# Patient Record
Sex: MALE | Race: WHITE | URBAN - METROPOLITAN AREA
[De-identification: names, ages, dates, MRNs, and addresses within clinical notes are randomized per-mention and may not be internally consistent; named-entity substitution may affect disease eponyms.]

---

## 2017-01-02 DIAGNOSIS — F41.9 ANXIETY: Primary | ICD-10-CM

## 2017-01-03 RX ORDER — CITALOPRAM HYDROBROMIDE 40 MG/1
40 TABLET ORAL DAILY
Qty: 30 TABLET | Refills: 0 | Status: SHIPPED | OUTPATIENT
Start: 2017-01-03 | End: 2017-02-16

## 2017-01-03 NOTE — TELEPHONE ENCOUNTER
Needs to be seen. He has not been seen in a year. Needs to be seen at least once per year.    Kim Hui

## 2017-01-03 NOTE — TELEPHONE ENCOUNTER
Please see 2/24/16 refill encounter.  PHQ-9 SCORE    1/23/2014    1/6/2016    3/7/2016      Total Score    6    -    -      Total Score MyChart    -    -    0      Total Score    -    18    -          ELPIDIO-7 SCORE    1/27/2014    1/6/2016    3/7/2016      Total Score    7    -    -      Total Score    -    -    0 (minimal anxiety)      Total Score    -    16    -        The only reason, RN cannot refill is because per last ov patient was to follow up in one month, however patient stated no, he was going to check in via mychart (versus ov). To provider to advise.    Faye Lloyd, RN

## 2017-01-04 ENCOUNTER — MYC MEDICAL ADVICE (OUTPATIENT)
Dept: FAMILY MEDICINE | Facility: CLINIC | Age: 32
End: 2017-01-04

## 2017-02-08 DIAGNOSIS — F41.9 ANXIETY: Primary | ICD-10-CM

## 2017-02-08 RX ORDER — CITALOPRAM HYDROBROMIDE 40 MG/1
40 TABLET ORAL DAILY
Qty: 30 TABLET | Refills: 0 | Status: CANCELLED | OUTPATIENT
Start: 2017-02-08

## 2017-02-09 ENCOUNTER — MYC MEDICAL ADVICE (OUTPATIENT)
Dept: FAMILY MEDICINE | Facility: CLINIC | Age: 32
End: 2017-02-09

## 2017-02-09 NOTE — TELEPHONE ENCOUNTER
Please call patient and have him schedule appt. Once he has, refill med for 30 days or to get him to his appt.    Kim Hui

## 2017-02-13 NOTE — TELEPHONE ENCOUNTER
Patient has not read his [employer] message. Called and left message for patient to return my cart.  Needs an appointment scheduled before refills can be given.  Please fill out the following questionnaire(s) prior to your upcoming appointment.  Griselda Wright cma

## 2017-02-14 NOTE — TELEPHONE ENCOUNTER
Lm for patient to return my call. This is the last attempted call will be closed.  Griselda Wright, cma

## 2017-02-16 DIAGNOSIS — F41.9 ANXIETY: ICD-10-CM

## 2017-02-17 RX ORDER — CITALOPRAM HYDROBROMIDE 40 MG/1
40 TABLET ORAL DAILY
Qty: 30 TABLET | Refills: 0 | Status: SHIPPED | OUTPATIENT
Start: 2017-02-17 | End: 2017-03-05

## 2017-02-17 NOTE — TELEPHONE ENCOUNTER
Duplicate refill request. See other request.  is trying to help patient schedule appt. Radha Nieves RN

## 2017-03-07 ENCOUNTER — OFFICE VISIT (OUTPATIENT)
Dept: FAMILY MEDICINE | Facility: CLINIC | Age: 32
End: 2017-03-07
Payer: COMMERCIAL

## 2017-03-07 VITALS
BODY MASS INDEX: 33.18 KG/M2 | DIASTOLIC BLOOD PRESSURE: 74 MMHG | SYSTOLIC BLOOD PRESSURE: 119 MMHG | WEIGHT: 224 LBS | TEMPERATURE: 98.4 F | HEART RATE: 97 BPM | HEIGHT: 69 IN

## 2017-03-07 DIAGNOSIS — F41.9 ANXIETY: ICD-10-CM

## 2017-03-07 PROCEDURE — 99213 OFFICE O/P EST LOW 20 MIN: CPT | Performed by: FAMILY MEDICINE

## 2017-03-07 RX ORDER — CITALOPRAM HYDROBROMIDE 40 MG/1
40 TABLET ORAL DAILY
Qty: 90 TABLET | Refills: 3 | Status: SHIPPED | OUTPATIENT
Start: 2017-03-07 | End: 2018-07-22

## 2017-03-07 RX ORDER — BUPROPION HYDROCHLORIDE 150 MG/1
150 TABLET ORAL EVERY MORNING
Qty: 90 TABLET | Refills: 3 | Status: SHIPPED | OUTPATIENT
Start: 2017-03-07 | End: 2018-07-22

## 2017-03-07 RX ORDER — ALPRAZOLAM 0.25 MG
0.25 TABLET ORAL 3 TIMES DAILY PRN
Qty: 20 TABLET | Refills: 0 | Status: SHIPPED | OUTPATIENT
Start: 2017-03-07

## 2017-03-07 ASSESSMENT — ANXIETY QUESTIONNAIRES
IF YOU CHECKED OFF ANY PROBLEMS ON THIS QUESTIONNAIRE, HOW DIFFICULT HAVE THESE PROBLEMS MADE IT FOR YOU TO DO YOUR WORK, TAKE CARE OF THINGS AT HOME, OR GET ALONG WITH OTHER PEOPLE: SOMEWHAT DIFFICULT
5. BEING SO RESTLESS THAT IT IS HARD TO SIT STILL: NOT AT ALL
3. WORRYING TOO MUCH ABOUT DIFFERENT THINGS: NOT AT ALL
2. NOT BEING ABLE TO STOP OR CONTROL WORRYING: NOT AT ALL
1. FEELING NERVOUS, ANXIOUS, OR ON EDGE: SEVERAL DAYS
7. FEELING AFRAID AS IF SOMETHING AWFUL MIGHT HAPPEN: SEVERAL DAYS
GAD7 TOTAL SCORE: 4
6. BECOMING EASILY ANNOYED OR IRRITABLE: MORE THAN HALF THE DAYS

## 2017-03-07 ASSESSMENT — PATIENT HEALTH QUESTIONNAIRE - PHQ9: 5. POOR APPETITE OR OVEREATING: NOT AT ALL

## 2017-03-07 NOTE — NURSING NOTE
"Chief Complaint   Patient presents with     Depression       Initial /74  Pulse 97  Temp 98.4  F (36.9  C) (Oral)  Ht 5' 9\" (1.753 m)  Wt 224 lb (101.6 kg)  BMI 33.08 kg/m2 Estimated body mass index is 33.08 kg/(m^2) as calculated from the following:    Height as of this encounter: 5' 9\" (1.753 m).    Weight as of this encounter: 224 lb (101.6 kg).  Medication Reconciliation: complete     Kinza Cruz MA      "

## 2017-03-07 NOTE — MR AVS SNAPSHOT
"              After Visit Summary   3/7/2017    David Rodriguez    MRN: [id number]           Patient Information     Date Of Birth          1985        Visit Information        Provider Department      3/7/2017 11:20 AM Kim Benedict MD [facility]        Today's Diagnoses     Anxiety           Follow-ups after your visit        Who to contact     If you have questions or need follow up information about today's clinic visit or your schedule please contact [facility] directly at [phone].  Normal or non-critical lab and imaging results will be communicated to you by MyChart, letter or phone within 4 business days after the clinic has received the results. If you do not hear from us within 7 days, please contact the clinic through eelusionhart or phone. If you have a critical or abnormal lab result, we will notify you by phone as soon as possible.  Submit refill requests through [employer] or call your pharmacy and they will forward the refill request to us. Please allow 3 business days for your refill to be completed.          Additional Information About Your Visit        MyChart Information     [employer] gives you secure access to your electronic health record. If you see a primary care provider, you can also send messages to your care team and make appointments. If you have questions, please call your primary care clinic.  If you do not have a primary care provider, please call [phone] and they will assist you.        Care EveryWhere ID     This is your Care EveryWhere ID. This could be used by other organizations to access your [facility] medical records  [id number]        Your Vitals Were     Pulse Temperature Height BMI (Body Mass Index)          97 98.4  F (36.9  C) (Oral) 5' 9\" (1.753 m) 33.08 kg/m2         Blood Pressure from Last 3 Encounters:   03/07/17 119/74   01/06/16 143/81   11/05/12 122/72    Weight from Last 3 Encounters:   03/07/17 224 lb (101.6 kg)   01/06/16 194 lb " (88 kg)   11/05/12 185 lb (83.9 kg)              Today, you had the following     No orders found for display         Today's Medication Changes          These changes are accurate as of: 3/7/17 11:59 PM.  If you have any questions, ask your nurse or doctor.               These medicines have changed or have updated prescriptions.        Dose/Directions    citalopram 40 MG tablet   Commonly known as:  celeXA   This may have changed:  additional instructions   Used for:  Anxiety   Changed by:  Kim Benedict MD        Dose:  40 mg   Take 1 tablet (40 mg) by mouth daily   Quantity:  90 tablet   Refills:  3            Where to get your medicines      These medications were sent to [facility] [address] [place], [place] [address] [facility] [place] [address] [employer] [facility] [address]     Phone:  [phone]     buPROPion 150 MG 24 hr tablet    citalopram 40 MG tablet         Some of these will need a paper prescription and others can be bought over the counter.  Ask your nurse if you have questions.     Bring a paper prescription for each of these medications     ALPRAZolam 0.25 MG tablet                Primary Care Provider Office Phone # Fax #    Kim Benedict [phone]       [facility] [id number] [place] [address]        Thank you!     Thank you for choosing [facility]  for your care. Our goal is always to provide you with excellent care. Hearing back from our patients is one way we can continue to improve our services. Please take a few minutes to complete the written survey that you may receive in the mail after your visit with us. Thank you!             Your Updated Medication List - Protect others around you: Learn how to safely use, store and throw away your medicines at www.disposemymeds.org.          This list is accurate as of: 3/7/17 11:59 PM.  Always use your most recent med list.                   Brand Name  Dispense Instructions for use    ALPRAZolam 0.25 MG tablet    XANAX    20 tablet    Take 1 tablet (0.25 mg) by mouth 3 times daily as needed for anxiety       buPROPion 150 MG 24 hr tablet    WELLBUTRIN XL    90 tablet    Take 1 tablet (150 mg) by mouth every morning       citalopram 40 MG tablet    celeXA    90 tablet    Take 1 tablet (40 mg) by mouth daily

## 2017-03-07 NOTE — PROGRESS NOTES
"  SUBJECTIVE:                                                    David Rodriguez is a 32 year old male who presents to clinic today for the following health issues:        Depression and Anxiety Follow-Up    Status since last visit: No change    Other associated symptoms:None    Complicating factors:     Significant life event: No     Current substance abuse: None    PHQ-9 SCORE 1/23/2014 1/6/2016 3/7/2016   Total Score 6 - -   Total Score MyChart - - 0   Total Score - 18 -     ELPIDIO-7 SCORE 1/27/2014 1/6/2016 3/7/2016   Total Score 7 - -   Total Score - - 0 (minimal anxiety)   Total Score - 16 -        PHQ-9  English      PHQ-9   Any Language     GAD7       Amount of exercise or physical activity: none     Problems taking medications regularly: No    Medication side effects: none  Diet: regular (no restrictions)        Problem list and histories reviewed & adjusted, as indicated.  Additional history: as documented    Labs reviewed in EPIC    Reviewed and updated as needed this visit by clinical staff  Tobacco  Allergies  Meds  Med Hx  Surg Hx  Fam Hx  Soc Hx      Reviewed and updated as needed this visit by Provider         ROS:  Constitutional, HEENT, cardiovascular, pulmonary, gi and gu systems are negative, except as otherwise noted.    OBJECTIVE:                                                    /74  Pulse 97  Temp 98.4  F (36.9  C) (Oral)  Ht 5' 9\" (1.753 m)  Wt 224 lb (101.6 kg)  BMI 33.08 kg/m2  Body mass index is 33.08 kg/(m^2).  GENERAL: healthy, alert and no distress  RESP: lungs clear to auscultation - no rales, rhonchi or wheezes  CV: regular rate and rhythm, normal S1 S2, no S3 or S4, no murmur, click or rub, no peripheral edema and peripheral pulses strong    Diagnostic Test Results:  none      ASSESSMENT/PLAN:                                                            1. Anxiety  Working well, refill   - citalopram (CELEXA) 40 MG tablet; Take 1 tablet (40 mg) by mouth daily  Dispense: 90 " tablet; Refill: 3  - buPROPion (WELLBUTRIN XL) 150 MG 24 hr tablet; Take 1 tablet (150 mg) by mouth every morning  Dispense: 90 tablet; Refill: 3  - ALPRAZolam (XANAX) 0.25 MG tablet; Take 1 tablet (0.25 mg) by mouth 3 times daily as needed for anxiety  Dispense: 20 tablet; Refill: 0        Kim Hui MD  [facility]

## 2017-03-08 ASSESSMENT — PATIENT HEALTH QUESTIONNAIRE - PHQ9: SUM OF ALL RESPONSES TO PHQ QUESTIONS 1-9: 5

## 2017-03-08 ASSESSMENT — ANXIETY QUESTIONNAIRES: GAD7 TOTAL SCORE: 4

## 2018-05-28 ENCOUNTER — NURSE TRIAGE (OUTPATIENT)
Dept: NURSING | Facility: CLINIC | Age: 33
End: 2018-05-28

## 2018-07-23 DIAGNOSIS — F41.9 ANXIETY: ICD-10-CM

## 2018-07-24 DIAGNOSIS — F41.9 ANXIETY: ICD-10-CM

## 2018-07-24 RX ORDER — BUPROPION HYDROCHLORIDE 150 MG/1
TABLET ORAL
Qty: 90 TABLET | Refills: 0 | OUTPATIENT
Start: 2018-07-24

## 2018-07-24 RX ORDER — CITALOPRAM HYDROBROMIDE 40 MG/1
TABLET ORAL
Qty: 90 TABLET | Refills: 0 | OUTPATIENT
Start: 2018-07-24

## 2018-07-25 RX ORDER — CITALOPRAM HYDROBROMIDE 40 MG/1
TABLET ORAL
Qty: 90 TABLET | Refills: 0 | OUTPATIENT
Start: 2018-07-25

## 2018-07-25 RX ORDER — BUPROPION HYDROCHLORIDE 150 MG/1
TABLET ORAL
Qty: 90 TABLET | Refills: 0 | OUTPATIENT
Start: 2018-07-25

## 2019-10-04 ENCOUNTER — HEALTH MAINTENANCE LETTER (OUTPATIENT)
Age: 34
End: 2019-10-04

## 2020-11-08 ENCOUNTER — HEALTH MAINTENANCE LETTER (OUTPATIENT)
Age: 35
End: 2020-11-08

## 2021-09-11 ENCOUNTER — HEALTH MAINTENANCE LETTER (OUTPATIENT)
Age: 36
End: 2021-09-11

## 2022-01-01 ENCOUNTER — HEALTH MAINTENANCE LETTER (OUTPATIENT)
Age: 37
End: 2022-01-01

## 2022-10-29 ENCOUNTER — HEALTH MAINTENANCE LETTER (OUTPATIENT)
Age: 37
End: 2022-10-29

## 2023-04-08 ENCOUNTER — HEALTH MAINTENANCE LETTER (OUTPATIENT)
Age: 38
End: 2023-04-08